# Patient Record
Sex: MALE | Race: WHITE | NOT HISPANIC OR LATINO | URBAN - NONMETROPOLITAN AREA
[De-identification: names, ages, dates, MRNs, and addresses within clinical notes are randomized per-mention and may not be internally consistent; named-entity substitution may affect disease eponyms.]

---

## 2017-06-19 ENCOUNTER — OFFICE VISIT (OUTPATIENT)
Dept: NEUROLOGY | Facility: CLINIC | Age: 82
End: 2017-06-19

## 2017-06-19 VITALS
BODY MASS INDEX: 18.9 KG/M2 | HEART RATE: 66 BPM | SYSTOLIC BLOOD PRESSURE: 110 MMHG | WEIGHT: 135 LBS | DIASTOLIC BLOOD PRESSURE: 64 MMHG | HEIGHT: 71 IN
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
## 2017-06-19 DIAGNOSIS — G23.1 PROGRESSIVE SUPRANUCLEAR PALSY (HCC): ICD-10-CM

## 2017-06-19 DIAGNOSIS — I10 ESSENTIAL HYPERTENSION: Primary | ICD-10-CM

## 2017-06-19 DIAGNOSIS — R26.89 IMBALANCE: ICD-10-CM

## 2017-06-19 PROCEDURE — 99213 OFFICE O/P EST LOW 20 MIN: CPT | Performed by: CLINICAL NURSE SPECIALIST

## 2017-06-19 NOTE — PROGRESS NOTES
Subjective     Chief Complaint   Patient presents with   • Neurologic Problem   • Parkinson's Disease       Obinna Mendoza is a 88 y.o. male right handed retired . He is here today for follow for supranuclear palsy. He was last seen December 2016.  He was seen as new patient in 2015 for progressive tremor concerning for parkinson.  It was thought it was more of supanuclear palsy.  At any rate, the patient was started on Sinemet  TID and had an improvement of his symptoms.  He and his wife and daughter deny progression of disease or worsening tremor.  The patient lives with his wife with little assistance. He does prepare their meals.  He was referred for PT as he was having falls and this did seem to help some. He continues to have shuffled gait and walks with rollerator.  The patietn denies on/off effect and denies freezing gait.     Parkinson's Disease   This is a chronic problem. The current episode started more than 1 year ago. The problem has been gradually improving. Pertinent negatives include no arthralgias, chest pain, fatigue, headaches, nausea, sore throat or vomiting. Associated symptoms comments: Tremors, drooling, shuffled gait, mask face, up gaze. Treatments tried: sinemet. The treatment provided moderate relief.        Current Outpatient Prescriptions   Medication Sig Dispense Refill   • amLODIPine (NORVASC) 10 MG tablet Take 5 mg by mouth Daily.     • carbidopa-levodopa (SINEMET)  MG per tablet Take 1 tablet by mouth 3 (Three) Times a Day. 270 tablet 3   • indapamide (LOZOL) 2.5 MG tablet Take 2.5 mg by mouth Every Morning.     • metoprolol succinate XL (TOPROL-XL) 50 MG 24 hr tablet Take 50 mg by mouth Daily.     • omeprazole (priLOSEC) 20 MG capsule Take 20 mg by mouth Daily.     • ramipril (ALTACE) 10 MG capsule Take 10 mg by mouth Daily.       No current facility-administered medications for this visit.        Past Medical History:   Diagnosis Date   • Arthritis    • GERD  "(gastroesophageal reflux disease)    • Glaucoma    • Hypertension    • Peptic ulceration    • Progressive supranuclear palsy        Past Surgical History:   Procedure Laterality Date   • EYE SURGERY      Glaucoma   • EYE SURGERY      Cataracts   • HERNIA REPAIR     • KNEE ARTHROPLASTY         family history includes Cataracts in his mother; Glaucoma in his mother; Heart disease in his father and mother.    Social History   Substance Use Topics   • Smoking status: Never Smoker   • Smokeless tobacco: Never Used   • Alcohol use No       Review of Systems   Constitutional: Negative.  Negative for fatigue.   HENT: Positive for drooling. Negative for rhinorrhea and sore throat.    Eyes: Negative.  Negative for visual disturbance.   Respiratory: Negative.  Negative for chest tightness and shortness of breath.    Cardiovascular: Negative.  Negative for chest pain.   Gastrointestinal: Negative.  Negative for constipation, diarrhea, nausea and vomiting.   Endocrine: Negative.    Genitourinary: Positive for dysuria.   Musculoskeletal: Positive for gait problem (shuffled). Negative for arthralgias.        Reports frequent falls and history of decrease ROM Left shoulder   Skin: Negative.  Negative for color change.   Allergic/Immunologic: Negative.    Neurological: Positive for tremors (improved with sinemet). Negative for dizziness, light-headedness and headaches. Speech difficulty: hypophonia.   Hematological: Negative.  Negative for adenopathy.   Psychiatric/Behavioral: Negative.  Negative for agitation, confusion and hallucinations.   All other systems reviewed and are negative.      Objective     /64  Pulse 66  Ht 71\" (180.3 cm)  Wt 135 lb (61.2 kg)  BMI 18.83 kg/m2, Body mass index is 18.83 kg/(m^2).    Physical Exam   Constitutional: He is oriented to person, place, and time. Vital signs are normal. He appears well-developed.   Very thin   HENT:   Head: Normocephalic and atraumatic.   Mouth/Throat: Mucous " membranes are normal. Oropharyngeal exudate present.   Eyes: Conjunctivae, EOM and lids are normal. Pupils are equal, round, and reactive to light.   Neck: Trachea normal. Neck supple. Carotid bruit is not present.   hypophonic   Cardiovascular: Normal rate, regular rhythm, S1 normal, S2 normal and normal heart sounds.    Pulmonary/Chest: Effort normal and breath sounds normal.   Abdominal: Soft. Bowel sounds are normal.   Musculoskeletal: Normal range of motion.   Neurological: He is alert and oriented to person, place, and time. He has normal strength and normal reflexes. He displays tremor (mild intention tremor left > right). A cranial nerve deficit (mask face other wise intact) is present. No sensory deficit. He exhibits abnormal muscle tone (no cogwheeling noted bilateral). He displays a negative Romberg sign. Gait (shuffled gait) abnormal.   Skin: Skin is warm and dry.   Psychiatric: He has a normal mood and affect. His speech is normal and behavior is normal. Cognition and memory are normal.   Nursing note and vitals reviewed.      Results for orders placed or performed during the hospital encounter of 07/14/15   Copper, serum   Result Value Ref Range    Copper 110 72 - 166 ug/dL   Zinc   Result Value Ref Range    Zinc 75 56 - 134 ug/dL        ASSESSMENT/PLAN    Diagnoses and all orders for this visit:    Essential hypertension    Progressive supranuclear palsy    Imbalance    Falls, sequela    MEDICAL DECISION MAKIN.  Continue with sinemet 25/100 TID at 7 AM, 11AM, and 3 pm  2. Home health referral for OT/PT for balance and strengthening  3. BP managed by PCP.  4. Patient does not drive, decrease mobility and vision difficulty.  5. Patient does not use tobacco,  6. Healthy BMI     allergies and all known medications/prescriptions have been reviewed using resources available on this encounter.    Return in about 6 months (around 2017).        KARTHIKEYAN Mena

## 2017-06-19 NOTE — PATIENT INSTRUCTIONS
Parkinson Disease  Parkinson disease is a long-term (chronic) condition. It gets worse over time (is progressive). Parkinson disease limits your ability:  · To control how your body moves.  · To move your body normally.  This condition affects each person differently. The condition can range from mild to very bad. This condition tends to progress slowly over many years.  HOME CARE  · Take over-the-counter and prescription medicines only as told by your doctor.  · Put grab bars and railings in your home. These help to prevent falls.  · Follow instructions from your doctor about what you can or cannot eat or drink.  · Go back to your normal activities as told by your doctor. Ask your doctor what activities are safe for you.  · Exercise as told by your doctor or physical therapist.  · Keep all follow-up visits as told by your doctor. This is important. These include any visits with a speech or occupational therapist.  · Think about joining a support group for people who have Parkinson disease.  GET HELP IF:  · Medicines do not help your symptoms.  · You feel off-balance.  · You fall at home.  · You need more help at home.  · You have:    Trouble swallowing.    A very hard time pooping (constipation).    A lot of side effects from your medicines.  · You see or hear things that are not real (hallucinate).  · You feel:    Confused.    Anxious.    Sad (depressed).  GET HELP RIGHT AWAY IF:  · You were hurt in a fall.  · You cannot swallow without choking.  · You have chest pain.  · You have trouble breathing.  · You do not feel safe at home.     This information is not intended to replace advice given to you by your health care provider. Make sure you discuss any questions you have with your health care provider.     Document Released: 03/11/2013 Document Revised: 04/10/2017 Document Reviewed: 10/07/2016  [employer] Interactive Patient Education ©2017 [employer]